# Patient Record
Sex: FEMALE | Employment: FULL TIME | URBAN - METROPOLITAN AREA
[De-identification: names, ages, dates, MRNs, and addresses within clinical notes are randomized per-mention and may not be internally consistent; named-entity substitution may affect disease eponyms.]

---

## 2023-10-12 PROBLEM — S46.012A STRAIN OF LEFT ROTATOR CUFF CAPSULE: Status: ACTIVE | Noted: 2023-10-12

## 2023-10-12 PROBLEM — E10.9 TYPE 1 DIABETES MELLITUS (MULTI): Status: ACTIVE | Noted: 2023-10-12

## 2023-10-12 PROBLEM — G62.9 NEUROPATHY: Status: ACTIVE | Noted: 2023-10-12

## 2023-10-12 PROBLEM — M75.02 ADHESIVE CAPSULITIS OF LEFT SHOULDER: Status: ACTIVE | Noted: 2023-10-12

## 2023-10-12 PROBLEM — M75.01 ADHESIVE CAPSULITIS OF RIGHT SHOULDER: Status: ACTIVE | Noted: 2023-10-12

## 2023-10-12 PROBLEM — M79.2 NERVE PAIN: Status: ACTIVE | Noted: 2023-10-12

## 2023-10-12 PROBLEM — M25.512 LEFT SHOULDER PAIN: Status: ACTIVE | Noted: 2023-10-12

## 2023-10-12 PROBLEM — D64.9 ANEMIA: Status: ACTIVE | Noted: 2023-10-12

## 2023-10-12 PROBLEM — M25.50 ARTHRALGIA: Status: ACTIVE | Noted: 2023-10-12

## 2023-10-12 PROBLEM — M54.2 CERVICAL PAIN: Status: ACTIVE | Noted: 2023-10-12

## 2023-10-12 PROBLEM — R76.8 RHEUMATOID FACTOR POSITIVE: Status: ACTIVE | Noted: 2023-10-12

## 2023-10-12 PROBLEM — S46.919A SHOULDER STRAIN: Status: ACTIVE | Noted: 2023-10-12

## 2023-10-12 RX ORDER — ACETAMINOPHEN 325 MG/1
1-2 TABLET ORAL EVERY 6 HOURS PRN
COMMUNITY
Start: 2021-01-19

## 2023-10-12 RX ORDER — LEVOTHYROXINE SODIUM 175 UG/1
1 TABLET ORAL
COMMUNITY
Start: 2023-09-12 | End: 2024-03-10

## 2023-10-12 RX ORDER — LEVOTHYROXINE SODIUM 100 UG/1
100 TABLET ORAL WEEKLY
COMMUNITY
Start: 2015-08-03

## 2023-10-12 RX ORDER — GLUCAGON 3 MG/1
3 POWDER NASAL
COMMUNITY
Start: 2023-09-19

## 2023-10-12 RX ORDER — NAPROXEN 500 MG/1
500 TABLET ORAL
COMMUNITY

## 2023-10-12 RX ORDER — SODIUM, POTASSIUM,MAG SULFATES 17.5-3.13G
SOLUTION, RECONSTITUTED, ORAL ORAL
COMMUNITY
Start: 2023-08-09

## 2023-10-12 RX ORDER — ATORVASTATIN CALCIUM 20 MG/1
1 TABLET, FILM COATED ORAL DAILY
COMMUNITY
Start: 2021-01-19

## 2023-10-12 RX ORDER — HYDROXYCHLOROQUINE SULFATE 200 MG/1
200 TABLET, FILM COATED ORAL DAILY
COMMUNITY
Start: 2023-09-01

## 2023-10-12 RX ORDER — INSULIN LISPRO 100 [IU]/ML
INJECTION, SOLUTION INTRAVENOUS; SUBCUTANEOUS
COMMUNITY
Start: 2014-05-26

## 2023-10-12 RX ORDER — INSULIN GLARGINE 100 [IU]/ML
15 INJECTION, SOLUTION SUBCUTANEOUS DAILY
COMMUNITY
Start: 2023-09-19

## 2023-10-12 RX ORDER — ADALIMUMAB 40MG/0.4ML
40 KIT SUBCUTANEOUS
COMMUNITY
Start: 2021-01-19

## 2023-10-12 RX ORDER — METHOTREXATE 2.5 MG/1
6 TABLET ORAL
COMMUNITY
Start: 2021-01-19

## 2023-10-12 RX ORDER — HYDROXYCHLOROQUINE SULFATE 200 MG/1
1 TABLET, FILM COATED ORAL 2 TIMES DAILY
COMMUNITY
Start: 2021-01-19

## 2023-10-12 RX ORDER — CIPROFLOXACIN 500 MG/1
1 TABLET ORAL 2 TIMES DAILY
COMMUNITY
Start: 2021-01-19

## 2023-10-12 RX ORDER — LEVOTHYROXINE SODIUM 200 UG/1
200 TABLET ORAL SEE ADMIN INSTRUCTIONS
COMMUNITY
Start: 2021-01-19

## 2023-10-12 RX ORDER — PAROXETINE 10 MG/1
TABLET, FILM COATED ORAL
COMMUNITY

## 2023-10-12 RX ORDER — INSULIN LISPRO 100 [IU]/ML
35 INJECTION, SOLUTION INTRAVENOUS; SUBCUTANEOUS DAILY
COMMUNITY
Start: 2017-03-20

## 2023-10-12 RX ORDER — METHOTREXATE 2.5 MG/1
2.5 TABLET ORAL
COMMUNITY

## 2023-10-13 ENCOUNTER — OFFICE VISIT (OUTPATIENT)
Dept: ORTHOPEDIC SURGERY | Facility: CLINIC | Age: 53
End: 2023-10-13
Payer: COMMERCIAL

## 2023-10-13 DIAGNOSIS — M75.41 IMPINGEMENT SYNDROME OF RIGHT SHOULDER: Primary | ICD-10-CM

## 2023-10-13 DIAGNOSIS — M75.01 ADHESIVE CAPSULITIS OF RIGHT SHOULDER: ICD-10-CM

## 2023-10-13 PROCEDURE — 99213 OFFICE O/P EST LOW 20 MIN: CPT | Performed by: PHYSICIAN ASSISTANT

## 2023-10-13 PROCEDURE — 20610 DRAIN/INJ JOINT/BURSA W/O US: CPT | Performed by: PHYSICIAN ASSISTANT

## 2023-10-13 RX ORDER — TRIAMCINOLONE ACETONIDE 40 MG/ML
10 INJECTION, SUSPENSION INTRA-ARTICULAR; INTRAMUSCULAR
Status: COMPLETED | OUTPATIENT
Start: 2023-10-13 | End: 2023-10-13

## 2023-10-13 RX ADMIN — TRIAMCINOLONE ACETONIDE 10 MG: 40 INJECTION, SUSPENSION INTRA-ARTICULAR; INTRAMUSCULAR at 09:20

## 2023-10-13 NOTE — PROGRESS NOTES
History of Present Illness:    52-year-old female presenting for follow-up for right frozen shoulder.  She previously saw Dr. Hightower about 8 months ago at which point she received a steroid injection which significantly improved her pain along with going to physical therapy.  States that she has seen some improvements in her motion, however the shoulder is still quite sore and has limited motion.      Past Medical History:   Diagnosis Date    Other specified abnormal immunological findings in serum 02/23/2016    Rheumatoid factor positive    Pain in unspecified knee 02/23/2016    Knee pain    Type 1 diabetes mellitus without complications (CMS/[facility]) 01/05/2021    Type 1 diabetes mellitus       Medication Documentation Review Audit    **Prior to Admission medications have not yet been reviewed**         Allergies   Allergen Reactions    Meloxicam Unknown       Social History     Socioeconomic History    Marital status:      Spouse name: Not on file    Number of children: Not on file    Years of education: Not on file    Highest education level: Not on file   Occupational History    Not on file   Tobacco Use    Smoking status: Not on file    Smokeless tobacco: Not on file   Substance and Sexual Activity    Alcohol use: Not on file    Drug use: Not on file    Sexual activity: Not on file   Other Topics Concern    Not on file   Social History Narrative    Not on file     Social Determinants of Health     Financial Resource Strain: Not on file   Food Insecurity: Not on file   Transportation Needs: Not on file   Physical Activity: Not on file   Stress: Not on file   Social Connections: Not on file   Intimate Partner Violence: Not on file   Housing Stability: Not on file       Past Surgical History:   Procedure Laterality Date    OTHER SURGICAL HISTORY  04/14/2020    No history of surgery         Review of Systems:    GENERAL: Negative  GI: Negative  MUSCULOSKELETAL: See HPI  SKIN: Negative  NEURO:  Negative      Physical Exam:    Right shoulder range of motion forward flexion abduction to 130 degrees.  Externally rotates 60 degrees.  Internally rotates to L5.  No acromioclavicular joint tenderness.  Positive biceps tendon tenderness.  Positive Neer's.  Positive Velez.  Negative Jobes.    Left unaffected shoulder range of motion forward flexion abduction 150 degrees.  Externally rotates 80 degrees.  Internally rotates to the opposite inferior scapula.  Elbow and wrist motion were not irritable.  Radial pulse 2+ and palpable. SILT. UE is NVI.    Patient ID: Dolly Del Rio is a 52 y.o. female.    L Inj/Asp: R glenohumeral on 10/13/2023 9:20 AM  Indications: pain  Details: 22 G needle, posterior approach  Medications: 10 mg triamcinolone acetonide 40 mg/mL  Outcome: tolerated well, no immediate complications  Procedure, treatment alternatives, risks and benefits explained, specific risks discussed. Consent was given by the patient. Immediately prior to procedure a time out was called to verify the correct patient, procedure, equipment, support staff and site/side marked as required. Patient was prepped and draped in the usual sterile fashion.           Assessment   Adhesive capsulitis, shoulder impingement      Plan  Had a long discussion with the patient regarding her right shoulder.  Discussed with her she is definitely still working through the adhesive capsulitis that she still has pain and limited range of motion.  This point I recommend to the patient that she continue with physical therapy working on aggressive range of motion.  I offer the patient a steroid injection into the shoulder which she wanted proceed with and tolerated well today.  Patient is also concerned that there may be some other underlying cause for the shoulder pain that she has been experiencing.  We will also order an MRI of the right shoulder for further evaluation.  We will have the patient follow-up with Dr. Hightower after the MRI to discuss results  and further treatment options.

## 2024-02-01 ENCOUNTER — HOSPITAL ENCOUNTER (OUTPATIENT)
Dept: RADIOLOGY | Facility: CLINIC | Age: 54
Discharge: HOME | End: 2024-02-01
Payer: COMMERCIAL

## 2024-02-01 DIAGNOSIS — M75.01 ADHESIVE CAPSULITIS OF RIGHT SHOULDER: ICD-10-CM

## 2024-02-01 PROCEDURE — 73221 MRI JOINT UPR EXTREM W/O DYE: CPT | Mod: RT

## 2024-02-01 PROCEDURE — 73221 MRI JOINT UPR EXTREM W/O DYE: CPT | Mod: RIGHT SIDE | Performed by: RADIOLOGY

## 2024-02-19 ENCOUNTER — APPOINTMENT (OUTPATIENT)
Dept: ORTHOPEDIC SURGERY | Facility: CLINIC | Age: 54
End: 2024-02-19

## 2024-03-31 NOTE — PROGRESS NOTES
Subjective    Patient ID: Dolly Del Rio is a 53 y.o. female.    Chief Complaint: Pain of the Right Shoulder     Last Surgery: No surgery found  Last Surgery Date: No surgery found    HPI  Dolly is a 53 y.o. right hand dominant female presenting today for evaluation of their right shoulder.     She is here with right shoulder for over 1 year. She was initially diagnosed with frozen shoulder. She did PT for 8 weeks with no change in her symptoms. She has also had two injections that did not help. She takes naproxen at night as it wakes her due to pain. The pain does radiate down past her elbow.     Her left shoulder is normal today. 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  She is a diabetic.     Past medical history, surgical history, social history, and family history were all reviewed and are as per the [facility] patient health history questionnaire form that I signed and scanned into the chart today.     Objective   Ortho Exam  Patient is a well-developed, well-nourished female in no acute distress.  Breathes with normal chest rises.  Pupils are round and symmetric today.  Awake, alert, and oriented x3.      Examination of the left shoulder today reveals the skin to be intact. There is no sign of any atrophy, lesions, or abrasions. There is no pain to palpation of the bony prominences. Cervical lymphadenopathy examined, and this was negative. Patient had 5 out of 5 wrist flexion, extension, and thumb extension bilaterally. Sensation was intact to light touch to median, ulnar, radial axillary, and musculocutaneous nerves bilaterally. Positive radial pulse bilaterally. Provocative maneuvers on the left side today were negative.  Range of motion of the left shoulder revealed 0-170° of forward elevation, 0-60° of external rotation, and internal rotation was to T-12.     Examination of the right shoulder today reveals the skin to be intact. There is no sign of any atrophy, lesions, or abrasions. There is no pain to palpation of the bony  prominences. Cervical lymphadenopathy examined, and this was negative. Patient had 5 out of 5 wrist flexion, extension, and thumb extension, bilaterally. Sensation was intact to light touch to median, ulnar, radial, axillary, and musculocutaneous nerves bilaterally. Positive radial pulse bilaterally. Positive Speed's, Randall's testing.  Range of motion of the right shoulder revealed 0-140° of forward elevation limited by pain, 0-40° of external rotation, and internal rotation up to her lumbar spine.      tender over ac joint, bicep  pos speeds, jobes     Image Results:  MR shoulder right wo IV contrast  Narrative: Interpreted By:  Catracho Holguin,   STUDY:  MRI of the right shoulder without IV contrast;  2/1/2024 6:11 pm      INDICATION:  Signs/Symptoms:EVALUATE ROTATOR CUFF TEAR RIGHT SHOULDER.      COMPARISON:  None      ACCESSION NUMBER(S):  [id number]      ORDERING CLINICIAN:  BENI QUINTANA      TECHNIQUE:  MR imaging of the right shoulder was obtained  without IV contrast.      FINDINGS:  ROTATOR CUFF TENDONS:  There is a small focal bursal surface partial-thickness tear of the  distal supraspinatus tendon superimposed on severe tendinopathy of  the supraspinatus and infraspinatus. No full-thickness tear seen. The  subscapularis tendon is intact. The teres minor tendon is intact.  There is no edema or fatty atrophy of the rotator cuff musculature.      BICEPS TENDON AND ROTATOR INTERVAL:  The intra-articular long head biceps tendon is thickened with  increased signal consistent with tendinopathy. Demonstrate a normal  course. The rotator interval is unremarkable.      JOINTS:  The acromioclavicular joint is unremarkable.      Evaluation of the glenohumeral articulation demonstrates no articular  cartilage defects.      No evidence of significant joint effusion. There is moderate amount  of fluid in the subacromial subdeltoid bursa consistent with  bursitis..      LABRUM:  The labrum is grossly unremarkable  given the limitations of a  non-arthrographic study.      OSSEOUS STRUCTURES:  No focal marrow replacing lesions are identified. There is no  fracture.      SOFT TISSUES:  The suprascapular nerve is intact at the suprascapular and  spinoglenoid notches.      Impression: 1.  Focal partial thickness bursal surface tear of the distal  supraspinatus tendon superimposed on severe tendinopathy.  2. Moderate subacromial subdeltoid bursitis.  3. Moderate tendinopathy of the intra-articular long head of biceps  tendon.          I personally reviewed the images/study and I agree with the findings  as stated. This study was interpreted at [place], [place].      MACRO:  None      Signed by: Catracho Holguin 2/2/2024 5:07 AM  Dictation workstation:   [id number]  04/02/24 MRI personally reviewed by me shows a very small full thickness superior rotator cuff tear. Some concerns of tendinopathy on the on the greater tuberosity Anterior and posterior rotator cuff intact. No arthritis. No fatty degeneration.    Assessment/Plan   Encounter Diagnoses:  No diagnosis found.  Patient with right shoulder rotator cuff tear    At this time, we had a long discussion regarding the rotator cuff and their full-thickness rotator cuff tear. In general, rotator cuff surgery is done through an arthroscopic approach. Risks of surgery include risk of infection, bleeding, nerve injury, and inability to heal the rotator cuff. The rotator cuff usually does not heal in 20-40% of patients, depending on the size of the rotator cuff tear. Despite this, we know that patients do quite well even when the rotator cuff only heals partially. We had a long discussion about the rehabilitation, which is 5 months. The first month is in a sling with activities at the waist. The next 2 months involve stretching and range of motion and light strengthening exercises. We slowly progress weight restrictions to activities at 5  months. It should be noted that no therapy is initiated until after one month of surgery. This is an outpatient procedure i.e. come and go home the same day. Anesthesia will be discussed with the anesthesiologist. All questions were answered for the patient. They are going to consider their options. They will give us a call in the future if they would like to pursue surgery. They were also given a handout on arthroscopic rotator cuff surgery today.       At this time, we had a long discussion regarding the rotator cuff and their full-thickness rotator cuff tear. In general, rotator cuff surgery is done through an arthroscopic approach. Risks of surgery include risk of infection, bleeding, nerve injury, and inability to heal the rotator cuff. The rotator cuff usually does not heal in 20-40% of patients, depending on the size of the rotator cuff tear. Despite this, we know that patients do quite well even when the rotator cuff only heals partially. We had a long discussion about the rehabilitation, which is 5 months. The first month is in a sling with activities at the waist. The next 2 months involve stretching and range of motion and light strengthening exercises. We slowly progress weight restrictions to activities at 5 months. It should be noted that no therapy is initiated until after one month of surgery. This is an outpatient procedure i.e. come and go home the same day. Anesthesia will be discussed with the anesthesiologist. All questions were answered for the patient. They are going to consider their options. They will give us a call in the future if they would like to pursue surgery. They were also given a handout on arthroscopic rotator cuff surgery today.       We had a long discussion regarding her diagnosis. We talked about her treatment options at length. She is going to do her exercises and try oral anti inflammatories. She was provided with a prescription for Meloxicam. We talked about PRP, and if she  was interested we could get her back in to see someone for that. She was encouraged to do her exercises at least twice a day, emphasizing scapular stabilizers. She was also provided with handouts covering rotator cuff surgery. We will follow up in 6 weeks or sooner if she is having difficulties.        No orders of the defined types were placed in this encounter.    Follow up in 6 weeks    Scribe Attestation  By signing my name below, IYanni Scribe   attest that this documentation has been prepared under the direction and in the presence of Lucas Andre MD.

## 2024-04-02 ENCOUNTER — OFFICE VISIT (OUTPATIENT)
Dept: ORTHOPEDIC SURGERY | Facility: HOSPITAL | Age: 54
End: 2024-04-02
Payer: COMMERCIAL

## 2024-04-02 DIAGNOSIS — M22.2X2 PATELLOFEMORAL PAIN SYNDROME OF LEFT KNEE: ICD-10-CM

## 2024-04-02 PROCEDURE — 99214 OFFICE O/P EST MOD 30 MIN: CPT | Performed by: ORTHOPAEDIC SURGERY

## 2024-04-02 PROCEDURE — 99204 OFFICE O/P NEW MOD 45 MIN: CPT | Performed by: ORTHOPAEDIC SURGERY

## 2024-04-03 RX ORDER — MELOXICAM 7.5 MG/1
7.5 TABLET ORAL DAILY
Qty: 14 TABLET | Refills: 0 | Status: SHIPPED | OUTPATIENT
Start: 2024-04-03 | End: 2024-04-17

## 2025-10-09 ENCOUNTER — APPOINTMENT (OUTPATIENT)
Dept: ENDOCRINOLOGY | Facility: CLINIC | Age: 55
End: 2025-10-09
Payer: COMMERCIAL